# Patient Record
Sex: FEMALE | RURAL
[De-identification: names, ages, dates, MRNs, and addresses within clinical notes are randomized per-mention and may not be internally consistent; named-entity substitution may affect disease eponyms.]

---

## 2017-12-11 ENCOUNTER — APPOINTMENT (RX ONLY)
Dept: RURAL CLINIC 1 | Facility: CLINIC | Age: 82
Setting detail: DERMATOLOGY
End: 2017-12-11

## 2017-12-11 PROBLEM — D48.5 NEOPLASM OF UNCERTAIN BEHAVIOR OF SKIN: Status: ACTIVE | Noted: 2017-12-11

## 2017-12-11 PROCEDURE — ? OTHER

## 2017-12-11 PROCEDURE — ? DEFER

## 2017-12-11 NOTE — PROCEDURE: OTHER
Other (Free Text): Patient is currently in comfort care at [facility]. Her notes state that she has an allergy to Novocaine. Contacted the facility to speak with Dr. Infante and he did confirm that in his previous notes it states that she does have a allergy to Novacaine and it shows that she develops a rash when she takes it. True allergy To Novocaine and lidocaine are very rare. Discussed with the nurse afterwords at [facility] that in order to do the biopsy by local anesthesia she would need to be seen by an allergist and truly tested for allergies to the enzo anesthetics otherwise I think the only consideration for biopsy is to be done under general Anesthesia. Patient has several other medical problems so that does not seem likely. Spoke with Dr. Infante about potentially getting more information from the family to see exactly what happened when local anesthesia was given in the past Other (Free Text): Patient is currently in comfort care at [facility]. Her notes state that she has an allergy to Novocaine. Contacted the facility to speak with Dr. Infante and he did confirm that in his previous notes it states that she does have a allergy to Novacaine and it shows that she develops a rash when she takes it. True allergy To Novocaine and lidocaine are very rare. Discussed with the nurse afterwords at [facility] that in order to do the biopsy by local anesthesia she would need to be seen by an allergist and truly tested for allergies to the enzo anesthetics otherwise I think the only consideration for biopsy is to be done under general Anesthesia. Patient has several other medical problems so that does not seem likely. Spoke with Dr. Infante about potentially getting more information from the family to see exactly what happened when local anesthesia was given in the past

## 2017-12-11 NOTE — HPI: SKIN LESION
Has Your Skin Lesion Been Treated?: not been treated
Is This A New Presentation, Or A Follow-Up?: Skin Lesion
Additional History: Patient was referred to our office by [facility] care